# Patient Record
Sex: MALE | Race: WHITE | NOT HISPANIC OR LATINO | Employment: FULL TIME | URBAN - METROPOLITAN AREA
[De-identification: names, ages, dates, MRNs, and addresses within clinical notes are randomized per-mention and may not be internally consistent; named-entity substitution may affect disease eponyms.]

---

## 2023-10-16 PROBLEM — H93.11 TINNITUS OF RIGHT EAR: Status: ACTIVE | Noted: 2023-10-16

## 2023-10-16 PROBLEM — H81.09 MENIERE DISEASE: Status: ACTIVE | Noted: 2023-10-16

## 2023-10-16 PROBLEM — H90.3 ASYMMETRIC SNHL (SENSORINEURAL HEARING LOSS): Status: ACTIVE | Noted: 2023-10-16

## 2023-10-16 RX ORDER — DIAZEPAM 5 MG/1
5 TABLET ORAL
COMMUNITY
End: 2024-03-26 | Stop reason: ALTCHOICE

## 2023-10-18 ENCOUNTER — ANCILLARY PROCEDURE (OUTPATIENT)
Dept: RADIOLOGY | Facility: CLINIC | Age: 48
End: 2023-10-18
Payer: COMMERCIAL

## 2023-10-18 ENCOUNTER — OFFICE VISIT (OUTPATIENT)
Dept: ORTHOPEDIC SURGERY | Facility: CLINIC | Age: 48
End: 2023-10-18
Payer: COMMERCIAL

## 2023-10-18 DIAGNOSIS — M25.561 ACUTE PAIN OF RIGHT KNEE: Primary | ICD-10-CM

## 2023-10-18 DIAGNOSIS — M25.561 ACUTE PAIN OF RIGHT KNEE: ICD-10-CM

## 2023-10-18 PROCEDURE — 73564 X-RAY EXAM KNEE 4 OR MORE: CPT | Mod: RT

## 2023-10-18 PROCEDURE — 99203 OFFICE O/P NEW LOW 30 MIN: CPT | Performed by: ORTHOPAEDIC SURGERY

## 2023-10-18 PROCEDURE — 2500000004 HC RX 250 GENERAL PHARMACY W/ HCPCS (ALT 636 FOR OP/ED): Performed by: ORTHOPAEDIC SURGERY

## 2023-10-18 PROCEDURE — 73564 X-RAY EXAM KNEE 4 OR MORE: CPT | Mod: RIGHT SIDE | Performed by: ORTHOPAEDIC SURGERY

## 2023-10-18 PROCEDURE — 99213 OFFICE O/P EST LOW 20 MIN: CPT | Mod: 25 | Performed by: ORTHOPAEDIC SURGERY

## 2023-10-18 PROCEDURE — 2500000005 HC RX 250 GENERAL PHARMACY W/O HCPCS: Performed by: ORTHOPAEDIC SURGERY

## 2023-10-18 PROCEDURE — 20610 DRAIN/INJ JOINT/BURSA W/O US: CPT | Mod: RT | Performed by: ORTHOPAEDIC SURGERY

## 2023-10-18 RX ORDER — TRIAMCINOLONE ACETONIDE 55 UG/1
2 SPRAY, METERED NASAL DAILY
COMMUNITY
Start: 2023-08-01

## 2023-10-18 RX ORDER — CETIRIZINE HYDROCHLORIDE 10 MG/1
10 TABLET ORAL
COMMUNITY

## 2023-10-18 RX ORDER — MELOXICAM 15 MG/1
15 TABLET ORAL
COMMUNITY
Start: 2023-10-03 | End: 2024-02-14 | Stop reason: SDUPTHER

## 2023-10-18 RX ORDER — LIDOCAINE HYDROCHLORIDE 10 MG/ML
5 INJECTION INFILTRATION; PERINEURAL
Status: COMPLETED | OUTPATIENT
Start: 2023-10-18 | End: 2023-10-18

## 2023-10-18 RX ORDER — BETAMETHASONE SODIUM PHOSPHATE AND BETAMETHASONE ACETATE 3; 3 MG/ML; MG/ML
12 INJECTION, SUSPENSION INTRA-ARTICULAR; INTRALESIONAL; INTRAMUSCULAR; SOFT TISSUE
Status: COMPLETED | OUTPATIENT
Start: 2023-10-18 | End: 2023-10-18

## 2023-10-18 RX ADMIN — BETAMETHASONE ACETATE AND BETAMETHASONE SODIUM PHOSPHATE 12 MG: 3; 3 INJECTION, SUSPENSION INTRA-ARTICULAR; INTRALESIONAL; INTRAMUSCULAR; SOFT TISSUE at 10:11

## 2023-10-18 RX ADMIN — LIDOCAINE HYDROCHLORIDE 5 ML: 10 INJECTION, SOLUTION INFILTRATION; PERINEURAL at 10:11

## 2023-10-18 NOTE — PROGRESS NOTES
History: Jacoby is here for his right knee.  He has had a history of increasing right knee pain over the last few years.  He denies any trauma.  He has pain mainly on the inside the knee.  He has been taking some anti-inflammatory medicine which does help.    Past medical history: Multiple  Medications: Multiple  Allergies: No known drug allergies    Please refer to the intake H&P regarding the patient's review of systems, family history and social history as was done today    HEENT: Normal  Lungs: Clear to auscultation  Heart: RRR  Abdomen: Soft, nontender  Skin: clear  Extremity: He has tenderness more medially.  1+ crepitus with range of motion.  Negative Lachman and posterior drawer.  Mild laxity with varus stress.  Positive posterior Lachman no numbness.    Contralateral exam is normal for strength, motion, stability and neurovascular assessment.    Radiographs: X-rays show mild to moderate degenerative change with some medial joint space loss.  There is some spurring around the patella.    Assessment: Moderate right knee DJD    Plan: We discussed several options and he would like to try to get off the medicine although it does help with the knee pain.  He would like to try an injection today.  He can wean off the anti-inflammatory medicines if doing better.  We discussed multiple other options for his knee arthritis as well as further imaging down the road if he has more mechanical symptoms.  All questions were answered today with the patient.    L Inj/Asp: R knee on 10/18/2023 10:11 AM  Indications: pain  Details: 22 G needle, lateral approach  Medications: 12 mg betamethasone acet,sod phos 6 mg/mL; 5 mL lidocaine 10 mg/mL (1 %)  Outcome: tolerated well, no immediate complications  Procedure, treatment alternatives, risks and benefits explained, specific risks discussed. Consent was given by the patient. Immediately prior to procedure a time out was called to verify the correct patient, procedure,  equipment, support staff and site/side marked as required. Patient was prepped and draped in the usual sterile fashion.          This note was generated with voice recognition software and may contain grammatical errors.

## 2023-11-22 ENCOUNTER — OFFICE VISIT (OUTPATIENT)
Dept: ORTHOPEDIC SURGERY | Facility: CLINIC | Age: 48
End: 2023-11-22
Payer: COMMERCIAL

## 2023-11-22 DIAGNOSIS — M17.11 PRIMARY OSTEOARTHRITIS OF RIGHT KNEE: Primary | ICD-10-CM

## 2023-11-22 PROCEDURE — 99213 OFFICE O/P EST LOW 20 MIN: CPT | Performed by: ORTHOPAEDIC SURGERY

## 2023-11-22 NOTE — PROGRESS NOTES
History: Jacoby is here for his right knee.  We did a cortisone shot which helped for about 2 weeks.  It is now worn off.  He is no longer taking his meloxicam either.  Here today for recheck.    Past medical history: Multiple  Medications: Multiple  Allergies: No known drug allergies    Please refer to the intake H&P regarding the patient's review of systems, family history and social history as was done today    HEENT: Normal  Lungs: Clear to auscultation  Heart: RRR  Abdomen: Soft, nontender  Skin: clear  Extremity: He has tenderness again medially.  1+ crepitus with range of motion.  Negative Lachman and posterior drawer.  Positive medial Keena's maneuver.  No numbness today.  Motion is full with minimal swelling.  Contralateral exam is normal for strength, motion, stability and neurovascular assessment.    Radiographs: Prior x-rays showed moderate degenerative change with some medial joint space narrowing.    Assessment: Moderate right knee DJD with possible internal derangement.    Plan: At this point he is still having persistent pain and trouble doing activities.  I would recommend an MRI to rule out any other internal derangement.  He understands he has some arthritic change that we can certainly assess for other abnormalities.  He denies need for any restrictions and does not wish to continue taking anti-inflammatories as it was not helping.  Will see him back to go the results.  All questions were answered today with the patient.    This note was generated with voice recognition software and may contain grammatical errors.

## 2023-12-15 ENCOUNTER — APPOINTMENT (OUTPATIENT)
Dept: RADIOLOGY | Facility: CLINIC | Age: 48
End: 2023-12-15
Payer: COMMERCIAL

## 2023-12-15 ENCOUNTER — OFFICE VISIT (OUTPATIENT)
Dept: OTOLARYNGOLOGY | Facility: CLINIC | Age: 48
End: 2023-12-15
Payer: COMMERCIAL

## 2023-12-15 ENCOUNTER — CLINICAL SUPPORT (OUTPATIENT)
Dept: AUDIOLOGY | Facility: CLINIC | Age: 48
End: 2023-12-15
Payer: COMMERCIAL

## 2023-12-15 ENCOUNTER — ANCILLARY PROCEDURE (OUTPATIENT)
Dept: RADIOLOGY | Facility: CLINIC | Age: 48
End: 2023-12-15
Payer: COMMERCIAL

## 2023-12-15 DIAGNOSIS — H81.01 MENIERE'S DISEASE OF RIGHT EAR: Primary | ICD-10-CM

## 2023-12-15 DIAGNOSIS — H81.01 MENIERE'S DISEASE OF RIGHT EAR: ICD-10-CM

## 2023-12-15 DIAGNOSIS — H90.41 SENSORINEURAL HEARING LOSS (SNHL) OF RIGHT EAR WITH UNRESTRICTED HEARING OF LEFT EAR: Primary | ICD-10-CM

## 2023-12-15 DIAGNOSIS — M17.11 PRIMARY OSTEOARTHRITIS OF RIGHT KNEE: ICD-10-CM

## 2023-12-15 PROCEDURE — 92567 TYMPANOMETRY: CPT | Performed by: AUDIOLOGIST

## 2023-12-15 PROCEDURE — 73721 MRI JNT OF LWR EXTRE W/O DYE: CPT | Mod: RIGHT SIDE | Performed by: RADIOLOGY

## 2023-12-15 PROCEDURE — 73721 MRI JNT OF LWR EXTRE W/O DYE: CPT | Mod: RT

## 2023-12-15 PROCEDURE — 92557 COMPREHENSIVE HEARING TEST: CPT | Performed by: AUDIOLOGIST

## 2023-12-15 PROCEDURE — 99213 OFFICE O/P EST LOW 20 MIN: CPT | Performed by: OTOLARYNGOLOGY

## 2023-12-15 NOTE — PROGRESS NOTES
Camron Junior is a 48 y.o. year old male patient with 6 MONTH FU ON MENIERE'S / HEARING       The patient presents to the office today for follow-up on ears and hearing.  The patient has known history of right sensorineural loss.  The patient has history of Ménière's disease.  Overall the patient reports that he has had no significant episodes since his last visit in the spring.  Does have some intermittent tinnitus in the right ear.  All other ENT issues are negative.        Physical Exam:   General appearance: No acute distress. Normal facies. Symmetric facial movement. No gross lesions of the face are noted.  The external ear structures appear normal. The ear canals patent and the tympanic membranes are intact without evidence of air-fluid levels, retraction, or congenital defects.  Anterior rhinoscopy notes essentially a midline nasal septum. Examination is noted for normal healthy mucosal membranes without any evidence of lesions, polyps, or exudate. The tongue is normally mobile. There are no lesions on the gingiva, buccal, or oral mucosa. There are no oral cavity masses.  The neck is negative for mass lymphadenopathy. The trachea and parotid are clear. The thyroid bed is grossly unremarkable. The salivary gland structures are grossly unremarkable.    Audiogram: Audiogram with essentially normal hearing left with mild sensorineural hearing loss right    Assessment/Plan   1.  Ménière's disease  Patient seen in the office today for follow-up on ears with history of Ménière's disease right.  Hearing is stable when compared to spring.  At this time I recommend observation and follow-up in 1 year.    All questions were answered in this regard accordingly.

## 2023-12-15 NOTE — PROGRESS NOTES
Mr. Junior, age 48, returned today for a repeat hearing evaluation during his ENT follow up with Dr. Dwyer.  He has a history of Meniere's in his right ear but stated he has not had any episodes or flare ups since his last office visit in the spring.  He does admit to intermittent tinnitus in his right ear but feels his right hearing has at least slightly improved.    Results:  Otoscopy revealed clear ear canals and tympanic membranes were visualized bilaterally.  Tympanometry revealed normal, Type A tympanograms, indicating normal ear canal volume, peak pressure and compliance bilaterally.  Audiometric thresholds revealed normal hearing sensitivity in his left ear and a mild sensorineural hearing loss in his right ear.  Word recognition scores were excellent bilaterally.  Hearing levels have improved overall as compared to his last evaluation April 2023.    Recommendations:  Follow-up with PCP, Cathy King CNP, as medically directed.  Follow-up with ENT, Dr. Dwyer, as medically directed.  Retest hearing annually to monitor or should concerns for a change in hearing arise.

## 2023-12-27 ENCOUNTER — OFFICE VISIT (OUTPATIENT)
Dept: ORTHOPEDIC SURGERY | Facility: CLINIC | Age: 48
End: 2023-12-27
Payer: COMMERCIAL

## 2023-12-27 DIAGNOSIS — M23.203 OLD COMPLEX TEAR OF MEDIAL MENISCUS OF RIGHT KNEE: ICD-10-CM

## 2023-12-27 DIAGNOSIS — M17.11 PRIMARY OSTEOARTHRITIS OF RIGHT KNEE: Primary | ICD-10-CM

## 2023-12-27 PROCEDURE — 99213 OFFICE O/P EST LOW 20 MIN: CPT | Performed by: ORTHOPAEDIC SURGERY

## 2023-12-27 NOTE — PROGRESS NOTES
History: Camron is here for his right knee.  He is still having persistent pain on the inside of the knee.  He did obtain his MRI.  He is not taking any medicine and since he has been off work for the last week he states his knee is feeling better.    Past medical history: Multiple  Medications: Multiple  Allergies: No known drug allergies    Please refer to the intake H&P regarding the patient's review of systems, family history and social history as was done today    HEENT: Normal  Lungs: Clear to auscultation  Heart: RRR  Abdomen: Soft, nontender  Skin: clear  Extremity: He again has tenderness over the medial joint.  1+ crepitus with range of motion.  Negative Lachman and posterior drawer.  Mildly positive medial Keena's maneuver.  No numbness or tingling.  Contralateral exam is normal for strength, motion, stability and neurovascular assessment.    Radiographs: MRI was reviewed showing a complex tear of the posterior horn of the medial meniscus.  Also questionable lateral meniscal tear.  There is some moderate developing arthritic change medially and laterally.    Assessment: Moderate right knee DJD with medial and lateral meniscal tears    Plan: Had a long discussion regarding treatment options.  We discussed the potential for meniscectomy as well as for advancement of his arthritis.  Given his improvement recently he would like to hold off on any surgery.  He is going to try to get back into some low impact type exercises as these did not bother him before.  He can use medicine or consider a gel injection as well.  If he would like to pursue diagnostic arthroscopy he can certainly call to set this up.  Will see him back over the next 6 weeks if needed.  All questions were answered today with the patient.    This note was generated with voice recognition software and may contain grammatical errors.

## 2024-02-14 DIAGNOSIS — M17.11 PRIMARY OSTEOARTHRITIS OF RIGHT KNEE: ICD-10-CM

## 2024-02-14 RX ORDER — MELOXICAM 15 MG/1
15 TABLET ORAL
Qty: 30 TABLET | Refills: 2 | Status: SHIPPED | OUTPATIENT
Start: 2024-02-14 | End: 2024-05-14

## 2024-03-05 NOTE — H&P (VIEW-ONLY)
Subjective  HPI Patient here for preoperative clearance.  He is scheduled for right knee arthroscopy on 3/28/24 with Dr. Tadeo José. He is not yet scheduled for PAT with the hospital.  He has never had anesthesia in the past but denies issues with family members in the past.  He is active, going to the  several nights a week doing both cardio and weights.  Can ride stationary bike for 25+ minutes without symptoms.  No chest pain or shortness of breath.  Currently feeling well. No constitutional symptoms.      STOP BANG Questionnaire     1. Snoring   Do you snore loudly (louder than talking or loud enough to be heard   through closed doors)?   YES  2. Tired   Do you often feel tired, fatigued, or sleepy during daytime?   NO  3. Observed   Has anyone observed you stop breathing during your sleep?   NO  4. Blood Pressure   Do you have or are you being treated for high blood pressure?   NO  5. BMI   BMI more than 35 kg/m2?   NO  6. Age   Age over 50 yr old?   NO  7. Neck circumference   Neck circumference greater than 40 cm?   YES  8. Gender   Gender male?   YES  * Neck circumference is measured by staff     High risk of LAURITA: answering yes to three or more items   Low risk of LAURITA: answering yes to less than three items      Review of Systems   All other systems reviewed and are negative.     Past Medical History:   PAST MEDICAL HISTORY   Diagnosis Date   • Abnormal LFTs    • Allergic rhinitis     seasonal allergies   • Hyperlipidemia    • Meniere's syndrome 2023         Past Surgical History:   PAST SURGICAL HISTORY   Procedure Laterality Date   • NONE       Family History:   FAMILY HISTORY    Problem Relation Age of Onset   • Cancer Paternal Grandmother    • Heart Paternal Grandmother    • Cancer Maternal Grandfather    • Heart Father    • Diabetes Paternal Aunt    • Cancer Mother         Ovarian     Social History:   Social History     Tobacco Use   • Smoking status: Never   • Smokeless tobacco: Never  "  Vaping Use   • Vaping Use: Never used   Substance Use Topics   • Alcohol use: Yes     Comment: seldom   • Drug use: Never     Current Medications: triamcinolone acetonide (NASACORT AQ) 55 mcg nasal inhaler, Use 2 Sprays in the nose as needed., Disp: , Rfl:   cetirizine (ZYRTEC) 10 mg tablet, Take 10 mg by mouth once daily., Disp: , Rfl:   Omega-3 Fatty Acids 300 mg cap, Take  by mouth., Disp: , Rfl:   MULTI-VITAMIN ORAL, Take  by mouth., Disp: , Rfl:   meloxicam (MOBIC) 15 mg tablet, Take 1 tablet by mouth once daily., Disp: 30 tablet, Rfl: 1  [DISCONTINUED] methylPREDNISolone (MEDROL, CAMERON,) 4 mg Dose-Pack, As instructed per package, Disp: 21 tablet, Rfl: 0  [DISCONTINUED] meloxicam (MOBIC) 15 mg tablet, Take 1 tablet by mouth once daily., Disp: 30 tablet, Rfl: 1    No facility-administered encounter medications on file as of 3/5/2024.      Allergies:   ALLERGIES   Allergen Reactions   • Seasonal Allergies   Unknown       Vitals: /78  Pulse 66  Temp 98.3  Ht 6' 1\" (1.85m)  Wt 264 lb 12.4 oz (120.1kg)  SpO2 95%  BMI 34.94 kg/(m^2).            Objective  Physical Exam  HENT:      Head: Normocephalic and atraumatic.      Right Ear: Tympanic membrane normal.      Left Ear: Tympanic membrane normal.   Eyes:      Conjunctiva/sclera: Conjunctivae normal.   Cardiovascular:      Rate and Rhythm: Normal rate and regular rhythm.      Heart sounds: Normal heart sounds.   Pulmonary:      Effort: Pulmonary effort is normal.      Breath sounds: Normal breath sounds.   Abdominal:      Palpations: Abdomen is soft.   Musculoskeletal:      Cervical back: Normal range of motion.   Skin:     General: Skin is warm and dry.   Neurological:      Mental Status: He is alert and oriented to person, place, and time.      Gait: Gait is intact.   Psychiatric:         Mood and Affect: Mood and affect normal.         Cognition and Memory: Memory normal.         Judgment: Judgment normal.        ASSESSMENT/PLAN:    1. Preoperative " clearance - ICD9: V72.84, ICD10: Z01.818 (primary diagnosis)  Patient at acceptable risk for planned procedure.  Aware he will need to stop NSAIDS at least 7 days prior to procedure.  At risk for LAURITA given STOPBANG results.     2. Primary osteoarthritis of right knee - ICD9: 715.16, ICD10: M17.11  Agree with planned procedure.     Cathy King, APRN.CNP

## 2024-03-06 PROBLEM — S83.241A OTHER TEAR OF MEDIAL MENISCUS, CURRENT INJURY, RIGHT KNEE, INITIAL ENCOUNTER: Status: ACTIVE | Noted: 2024-03-05

## 2024-03-06 PROBLEM — S83.281A OTHER TEAR OF LATERAL MENISCUS, CURRENT INJURY, RIGHT KNEE, INITIAL ENCOUNTER: Status: ACTIVE | Noted: 2024-03-05

## 2024-03-08 ENCOUNTER — HOSPITAL ENCOUNTER (OUTPATIENT)
Dept: CARDIOLOGY | Facility: HOSPITAL | Age: 49
Discharge: HOME | End: 2024-03-08
Payer: COMMERCIAL

## 2024-03-08 DIAGNOSIS — S83.281A OTHER TEAR OF LATERAL MENISCUS, CURRENT INJURY, RIGHT KNEE, INITIAL ENCOUNTER: ICD-10-CM

## 2024-03-08 DIAGNOSIS — S83.241A OTHER TEAR OF MEDIAL MENISCUS, CURRENT INJURY, RIGHT KNEE, INITIAL ENCOUNTER: ICD-10-CM

## 2024-03-08 LAB
ATRIAL RATE: 66 BPM
P AXIS: 33 DEGREES
P OFFSET: 191 MS
P ONSET: 141 MS
PR INTERVAL: 158 MS
Q ONSET: 220 MS
QRS COUNT: 10 BEATS
QRS DURATION: 92 MS
QT INTERVAL: 374 MS
QTC CALCULATION(BAZETT): 392 MS
QTC FREDERICIA: 386 MS
R AXIS: 10 DEGREES
T AXIS: 26 DEGREES
T OFFSET: 407 MS
VENTRICULAR RATE: 66 BPM

## 2024-03-08 PROCEDURE — 93010 ELECTROCARDIOGRAM REPORT: CPT | Performed by: INTERNAL MEDICINE

## 2024-03-08 PROCEDURE — 93005 ELECTROCARDIOGRAM TRACING: CPT

## 2024-03-18 NOTE — PROGRESS NOTES
Camron Junior is here today for a pre-op visit of his right knee.  He is scheduled for right knee partial medial and lateral meniscectomy    This is a pre-op visit to discuss the risks and benefits of surgery. The risks and benefits were fully explained to the patient. The patient wishes to proceed.     With any surgery, infection is a risk; usually 1-2%. It can become higher for individuals with diabetes, rheumatoid arthritis, previous surgery, individuals on oral steroids, or obese individuals. All of these issues were properly addressed and considered. Reassured all sterile techniques would be followed.  Severe infections may require removal of any prosthesis.    The patient will be given preop antibiotics. It was also explained to the patient that there will be some blood loss during the procedure but that blood transfusion is usually not necessary. It is also noted that with knee surgery a tourniquet is applied to lower the amount of blood loss during the case.    Pulmonary embolism and blood clots were also discussed with the patient and told that these can have fatal complications. It is explained to the patient that the use of ROBERT hose, foot pumps, incentive spirometers, quick mobility and the use of blood thinners/Aspirin for a period of 21-28 days is the standard preventative care.     It was explained that surgery is often used to decrease pain, provide stability, and improve strength but individuals will have varying results postoperatively. There can be variation in motion and a risk of stiffness. It is also stated that occasionally we will have to manipulate an extremity if pain and stiffness persist. We also discussed there are limitations with some motions after certain surgeries.     Fracture, though rare, may also occur intraoperatively. There is also the possibility of nerve or vascular injuries as well. There is potential for continued numbness or tingling. The benefits of  anesthesia were explained to the patient.     All of the patient's questions were answered.     Results/Imaging: Previous MRI showed a posterior horn medial meniscus tear and a questionable lateral meniscus tear.  There is moderate arthritic change both medially and laterally.    Assessment: Moderate right knee DJD with medial and lateral meniscal tears    Plan:   I have personally reviewed the OARRS report for this patient. This report is scanned into the electronic medical record. I have considered the risks of abuse, dependence, addiction, and diversion. The patient's current pain level is 5.  Post operative pain medication was sent to the patient's pharmacy.  The patient will not begin taking this until after surgery.     He understands the arthroscopy will not treat any of his underlying arthritic change.    He will follow up 1 week post op.    This note was generated with voice recognition software and may contain grammatical errors.

## 2024-03-18 NOTE — PROGRESS NOTES
"  Physical Therapy  Physical Therapy Evaluation    Patient Name: Camron Junior \"Paco\"  MRN: [id number]  Today's Date: 3/20/2024  Time Calculation  Start Time: 0900  Stop Time: 0939  Time Calculation (min): 39 min    Insurance:  Visit number: 1  Authorization info: no auth required  Insurance Type: [employer] [place] [employer] [place]    General:  Reason for visit: R knee pain  Referred by: Edgar José MD    Current Problem:  1. Right knee pain  Follow Up In Physical Therapy                 Medical History Form: Reviewed (scanned into chart)    Subjective:     HPI:  Pt reports to PT with c/o R knee pain. Reports he has noticed pain has progressively gotten worse starting this last fall (around 11/23). Noticed minor improvement with performance of lower body exercises, but would only last temporarily. Started seeing Dr. José in 11/23 and pursued conservative treatment route with injections to see if it was effective. Reported they effective for about a week, then no longer effective. Followed up with Dr. José in 01/24 and discussed going route of surgical intervention. Will have surgery 03/28/24 for R knee medial and lateral menisectomy. Denies any n/t.     Chief Complaint: Patient presents to clinic c/o R knee pain  Onset Date: 11/22/23  EMERSON: Insidious    Current Condition:   Same    Pertinent PMH includes:  PSHx:    Pain:     Location: R knee, medial joint line  Description: stabbing  Worst: 6/10  Best: 2/10  Current: 3/10  Aggravating Factors:  standing for too long, kneeling, squatting, getting off ground,   Relieving Factors:  off-weighting      Relevant Information (PMH & Previous Tests/Imaging):   X-rays 10/18/23: \"mild to moderate degenerative change with medial and patellofemoral narrowing. There is spurring as well. Alignment is neutral.  Fragmentation of the tibial tubercle from prior Osgood-Schlatter's  disease is also present.\"    MRI 12/15/23: \"1. Complex tearing of the medial meniscus.  2. Possible " "mild free margin radial blunting of the body of the  lateral meniscus.  3. Mild-to-moderate degenerative change of the knee with hyaline  articular cartilage fissuring/loss as above.  4. Findings which may represent sequelae of prior Osgood-Schlatter's  disease. There is mild deep infrapatellar bursitis.  5. Small volume knee joint effusion and small volume popliteal cyst.\"     Previous Interventions/Treatments: Injections    Prior Level of Function (PLOF)  Patient previously independent with all ADLs  Exercise/Physical Activity: going to the gym, weight lifting  Work/School: , 5th grade  Biggest limitation: standing long times  Chief complaint: pain      Patients Living Environment: Reviewed and no concern    Primary Language: English    Patient's Goal(s) for Therapy: Return ROM     Red Flags: Do you have any of the following? No  Fever/chills, unexplained weight changes, dizziness/fainting, unexplained change in bowel or bladder functions, unexplained malaise or muscle weakness, night pain/sweats, numbness or tingling    Objective:    Lx screen: unremarkable    Gait Assessment - no signs of antalgic gait    AROM  Right knee flexion: 125    Left knee flexion: 130  Right knee extension: 0   Left knee extension: 0     MMT  Right quadriceps: 4/5   Left quadriceps: 4/5  Right iliopsoas: 4-/5   Left iliopsoas: 4-/5  Right gluteus medius: 5/5     Left gluteus medius: 5/5  Right hamstrin/5  Left hamstrin/5    Palpation  TTP at R knee medial joint line; No TTP at lateral joint line, B hamstring tendons, patella/patellar tendon    Special Tests    Thessaly (+)  Keena R (-)  Lachman R/L (-)  Valgus R/L (-)  Varus  R/L (-)        Outcome Measures:  LEFS: 58/80      EDUCATION: Pt educated in HEP, PT POC, anatomy. pt demonstrates understanding of PT info, all questions answered.    HEP:  Single leg bridge 2x10  Staggered STS 2x10  Knee ext. Iso vs. Band 2x10 5 sec hold black tb  Band resisted " "side steps 2x10 blue tb      Goals: Set and discussed today  1.) Independent with HEP to expedite progress and promote goal achievement.  2.) Reduce worst reported pain to < 3/10 in order to meet MCID.   3.) Improve R knee  ROM to WFL post-op in order to allow patient to safely navigate home and community environments without compensatory strategies.   4.) Improve R knee and hip strength to >/= 4/5 in order to allow patient to return to PLOF  and return to performance of healthful and wellness activities without limitation/with decreased discomfort  5.) Report change in LEFS by greater than or equal to 9 points to meet the MCID (IE 58/80)        Plan of care was developed with input and agreement by the patient.    Treatment Performed:    Therapeutic Exercise:    10 min  Single leg bridge 1x10  Staggered STS 1x10  Knee ext. Iso vs. Band 1x10  Band resisted side steps 1x10        Assessment: 47 y/o M presents with c/o chronic R knee pain. Upon examination patient demonstrates pain, decreased R knee ROM, and decreased R LE strength limiting overall functional mobility including standing. Activity limitations and participations restrictions include decreased time in gym lifting weights. Pt presentation consistent with dx of R knee pain 2/2 R medial and lateral meniscus tears. Pt to benefit from outpatient PT to address deficits, maximize functional mobility and improve QOL.  The clinical presentation of this patient is stable and their history and examination findings are consistent with a low complexity evaluation with good rehab potential.           Plan:   Camron, \"Paco\" will be placed on hold for therapy until after completion of surgical procedure. Upon return to therapy, patient's status will be re-evaluated. POFU scheduled today. Discussed anticipated plan of scheduling patient 1-2x/wk for 8 weeks post-op with patient.       Marcin Alanis, PT  "

## 2024-03-20 ENCOUNTER — EVALUATION (OUTPATIENT)
Dept: PHYSICAL THERAPY | Facility: CLINIC | Age: 49
End: 2024-03-20
Payer: COMMERCIAL

## 2024-03-20 ENCOUNTER — OFFICE VISIT (OUTPATIENT)
Dept: ORTHOPEDIC SURGERY | Facility: CLINIC | Age: 49
End: 2024-03-20
Payer: COMMERCIAL

## 2024-03-20 DIAGNOSIS — G89.29 CHRONIC PAIN OF RIGHT KNEE: ICD-10-CM

## 2024-03-20 DIAGNOSIS — M23.203 OLD COMPLEX TEAR OF MEDIAL MENISCUS OF RIGHT KNEE: ICD-10-CM

## 2024-03-20 DIAGNOSIS — M17.11 PRIMARY OSTEOARTHRITIS OF RIGHT KNEE: Primary | ICD-10-CM

## 2024-03-20 DIAGNOSIS — M25.561 CHRONIC PAIN OF RIGHT KNEE: ICD-10-CM

## 2024-03-20 DIAGNOSIS — M25.561 RIGHT KNEE PAIN: Primary | ICD-10-CM

## 2024-03-20 PROCEDURE — 97161 PT EVAL LOW COMPLEX 20 MIN: CPT | Mod: GP

## 2024-03-20 PROCEDURE — 99024 POSTOP FOLLOW-UP VISIT: CPT | Performed by: PHYSICIAN ASSISTANT

## 2024-03-20 PROCEDURE — 97110 THERAPEUTIC EXERCISES: CPT | Mod: GP

## 2024-03-20 RX ORDER — HYDROCODONE BITARTRATE AND ACETAMINOPHEN 5; 325 MG/1; MG/1
1 TABLET ORAL EVERY 6 HOURS PRN
Qty: 12 TABLET | Refills: 0 | Status: SHIPPED | OUTPATIENT
Start: 2024-03-20 | End: 2024-03-23

## 2024-03-20 ASSESSMENT — PATIENT HEALTH QUESTIONNAIRE - PHQ9
1. LITTLE INTEREST OR PLEASURE IN DOING THINGS: NOT AT ALL
2. FEELING DOWN, DEPRESSED OR HOPELESS: NOT AT ALL
SUM OF ALL RESPONSES TO PHQ9 QUESTIONS 1 AND 2: 0

## 2024-03-28 ENCOUNTER — ANESTHESIA (OUTPATIENT)
Dept: OPERATING ROOM | Facility: HOSPITAL | Age: 49
End: 2024-03-28
Payer: COMMERCIAL

## 2024-03-28 ENCOUNTER — HOSPITAL ENCOUNTER (OUTPATIENT)
Facility: HOSPITAL | Age: 49
Setting detail: OUTPATIENT SURGERY
Discharge: HOME | End: 2024-03-28
Attending: ORTHOPAEDIC SURGERY | Admitting: ORTHOPAEDIC SURGERY
Payer: COMMERCIAL

## 2024-03-28 ENCOUNTER — ANESTHESIA EVENT (OUTPATIENT)
Dept: OPERATING ROOM | Facility: HOSPITAL | Age: 49
End: 2024-03-28
Payer: COMMERCIAL

## 2024-03-28 VITALS
HEART RATE: 62 BPM | HEIGHT: 73 IN | BODY MASS INDEX: 34.04 KG/M2 | OXYGEN SATURATION: 97 % | WEIGHT: 256.84 LBS | DIASTOLIC BLOOD PRESSURE: 84 MMHG | RESPIRATION RATE: 18 BRPM | SYSTOLIC BLOOD PRESSURE: 122 MMHG | TEMPERATURE: 96.8 F

## 2024-03-28 DIAGNOSIS — S83.241A OTHER TEAR OF MEDIAL MENISCUS, CURRENT INJURY, RIGHT KNEE, INITIAL ENCOUNTER: Primary | ICD-10-CM

## 2024-03-28 DIAGNOSIS — S83.281A OTHER TEAR OF LATERAL MENISCUS, CURRENT INJURY, RIGHT KNEE, INITIAL ENCOUNTER: ICD-10-CM

## 2024-03-28 PROCEDURE — 2500000004 HC RX 250 GENERAL PHARMACY W/ HCPCS (ALT 636 FOR OP/ED): Performed by: PHYSICIAN ASSISTANT

## 2024-03-28 PROCEDURE — 2500000004 HC RX 250 GENERAL PHARMACY W/ HCPCS (ALT 636 FOR OP/ED): Performed by: STUDENT IN AN ORGANIZED HEALTH CARE EDUCATION/TRAINING PROGRAM

## 2024-03-28 PROCEDURE — 7100000010 HC PHASE TWO TIME - EACH INCREMENTAL 1 MINUTE: Performed by: ORTHOPAEDIC SURGERY

## 2024-03-28 PROCEDURE — 3600000009 HC OR TIME - EACH INCREMENTAL 1 MINUTE - PROCEDURE LEVEL FOUR: Performed by: ORTHOPAEDIC SURGERY

## 2024-03-28 PROCEDURE — 3700000001 HC GENERAL ANESTHESIA TIME - INITIAL BASE CHARGE: Performed by: ORTHOPAEDIC SURGERY

## 2024-03-28 PROCEDURE — A4217 STERILE WATER/SALINE, 500 ML: HCPCS | Performed by: ORTHOPAEDIC SURGERY

## 2024-03-28 PROCEDURE — 3700000002 HC GENERAL ANESTHESIA TIME - EACH INCREMENTAL 1 MINUTE: Performed by: ORTHOPAEDIC SURGERY

## 2024-03-28 PROCEDURE — 2720000007 HC OR 272 NO HCPCS: Performed by: ORTHOPAEDIC SURGERY

## 2024-03-28 PROCEDURE — 7100000001 HC RECOVERY ROOM TIME - INITIAL BASE CHARGE: Performed by: ORTHOPAEDIC SURGERY

## 2024-03-28 PROCEDURE — 7100000009 HC PHASE TWO TIME - INITIAL BASE CHARGE: Performed by: ORTHOPAEDIC SURGERY

## 2024-03-28 PROCEDURE — 2500000005 HC RX 250 GENERAL PHARMACY W/O HCPCS: Performed by: ORTHOPAEDIC SURGERY

## 2024-03-28 PROCEDURE — 2500000004 HC RX 250 GENERAL PHARMACY W/ HCPCS (ALT 636 FOR OP/ED): Performed by: ORTHOPAEDIC SURGERY

## 2024-03-28 PROCEDURE — 7100000002 HC RECOVERY ROOM TIME - EACH INCREMENTAL 1 MINUTE: Performed by: ORTHOPAEDIC SURGERY

## 2024-03-28 PROCEDURE — 3600000004 HC OR TIME - INITIAL BASE CHARGE - PROCEDURE LEVEL FOUR: Performed by: ORTHOPAEDIC SURGERY

## 2024-03-28 PROCEDURE — 2500000005 HC RX 250 GENERAL PHARMACY W/O HCPCS: Performed by: STUDENT IN AN ORGANIZED HEALTH CARE EDUCATION/TRAINING PROGRAM

## 2024-03-28 PROCEDURE — 29880 ARTHRS KNE SRG MNISECTMY M&L: CPT | Performed by: ORTHOPAEDIC SURGERY

## 2024-03-28 RX ORDER — LIDOCAINE HYDROCHLORIDE 20 MG/ML
INJECTION, SOLUTION INFILTRATION; PERINEURAL AS NEEDED
Status: DISCONTINUED | OUTPATIENT
Start: 2024-03-28 | End: 2024-03-28

## 2024-03-28 RX ORDER — LIDOCAINE HYDROCHLORIDE 10 MG/ML
INJECTION INFILTRATION; PERINEURAL AS NEEDED
Status: DISCONTINUED | OUTPATIENT
Start: 2024-03-28 | End: 2024-03-28 | Stop reason: HOSPADM

## 2024-03-28 RX ORDER — DROPERIDOL 2.5 MG/ML
0.62 INJECTION, SOLUTION INTRAMUSCULAR; INTRAVENOUS ONCE AS NEEDED
Status: DISCONTINUED | OUTPATIENT
Start: 2024-03-28 | End: 2024-03-28 | Stop reason: HOSPADM

## 2024-03-28 RX ORDER — SODIUM CHLORIDE, SODIUM LACTATE, POTASSIUM CHLORIDE, CALCIUM CHLORIDE 600; 310; 30; 20 MG/100ML; MG/100ML; MG/100ML; MG/100ML
100 INJECTION, SOLUTION INTRAVENOUS CONTINUOUS
Status: DISCONTINUED | OUTPATIENT
Start: 2024-03-28 | End: 2024-03-28 | Stop reason: HOSPADM

## 2024-03-28 RX ORDER — FENTANYL CITRATE 50 UG/ML
50 INJECTION, SOLUTION INTRAMUSCULAR; INTRAVENOUS EVERY 5 MIN PRN
Status: DISCONTINUED | OUTPATIENT
Start: 2024-03-28 | End: 2024-03-28 | Stop reason: HOSPADM

## 2024-03-28 RX ORDER — KETOROLAC TROMETHAMINE 30 MG/ML
INJECTION, SOLUTION INTRAMUSCULAR; INTRAVENOUS AS NEEDED
Status: DISCONTINUED | OUTPATIENT
Start: 2024-03-28 | End: 2024-03-28

## 2024-03-28 RX ORDER — MIDAZOLAM HYDROCHLORIDE 1 MG/ML
INJECTION, SOLUTION INTRAMUSCULAR; INTRAVENOUS AS NEEDED
Status: DISCONTINUED | OUTPATIENT
Start: 2024-03-28 | End: 2024-03-28

## 2024-03-28 RX ORDER — CEFAZOLIN SODIUM 2 G/100ML
2 INJECTION, SOLUTION INTRAVENOUS ONCE
Status: COMPLETED | OUTPATIENT
Start: 2024-03-28 | End: 2024-03-28

## 2024-03-28 RX ORDER — SODIUM CHLORIDE 0.9 G/100ML
IRRIGANT IRRIGATION AS NEEDED
Status: DISCONTINUED | OUTPATIENT
Start: 2024-03-28 | End: 2024-03-28 | Stop reason: HOSPADM

## 2024-03-28 RX ORDER — LABETALOL HYDROCHLORIDE 5 MG/ML
5 INJECTION, SOLUTION INTRAVENOUS ONCE AS NEEDED
Status: DISCONTINUED | OUTPATIENT
Start: 2024-03-28 | End: 2024-03-28 | Stop reason: HOSPADM

## 2024-03-28 RX ORDER — FENTANYL CITRATE 50 UG/ML
INJECTION, SOLUTION INTRAMUSCULAR; INTRAVENOUS AS NEEDED
Status: DISCONTINUED | OUTPATIENT
Start: 2024-03-28 | End: 2024-03-28

## 2024-03-28 RX ORDER — OXYCODONE HYDROCHLORIDE 5 MG/1
5 TABLET ORAL EVERY 4 HOURS PRN
Status: CANCELLED | OUTPATIENT
Start: 2024-03-28

## 2024-03-28 RX ORDER — SODIUM CHLORIDE, SODIUM LACTATE, POTASSIUM CHLORIDE, CALCIUM CHLORIDE 600; 310; 30; 20 MG/100ML; MG/100ML; MG/100ML; MG/100ML
100 INJECTION, SOLUTION INTRAVENOUS CONTINUOUS
Status: CANCELLED | OUTPATIENT
Start: 2024-03-28

## 2024-03-28 RX ORDER — DIPHENHYDRAMINE HYDROCHLORIDE 50 MG/ML
12.5 INJECTION INTRAMUSCULAR; INTRAVENOUS ONCE AS NEEDED
Status: DISCONTINUED | OUTPATIENT
Start: 2024-03-28 | End: 2024-03-28 | Stop reason: HOSPADM

## 2024-03-28 RX ORDER — MEPERIDINE HYDROCHLORIDE 25 MG/ML
12.5 INJECTION INTRAMUSCULAR; INTRAVENOUS; SUBCUTANEOUS EVERY 10 MIN PRN
Status: DISCONTINUED | OUTPATIENT
Start: 2024-03-28 | End: 2024-03-28 | Stop reason: HOSPADM

## 2024-03-28 RX ORDER — PROPOFOL 10 MG/ML
INJECTION, EMULSION INTRAVENOUS AS NEEDED
Status: DISCONTINUED | OUTPATIENT
Start: 2024-03-28 | End: 2024-03-28

## 2024-03-28 RX ORDER — ONDANSETRON HYDROCHLORIDE 2 MG/ML
INJECTION, SOLUTION INTRAVENOUS AS NEEDED
Status: DISCONTINUED | OUTPATIENT
Start: 2024-03-28 | End: 2024-03-28

## 2024-03-28 RX ORDER — LIDOCAINE HYDROCHLORIDE 10 MG/ML
0.1 INJECTION, SOLUTION EPIDURAL; INFILTRATION; INTRACAUDAL; PERINEURAL ONCE
Status: DISCONTINUED | OUTPATIENT
Start: 2024-03-28 | End: 2024-03-28 | Stop reason: HOSPADM

## 2024-03-28 RX ADMIN — FENTANYL CITRATE 50 MCG: 50 INJECTION, SOLUTION INTRAMUSCULAR; INTRAVENOUS at 08:09

## 2024-03-28 RX ADMIN — CEFAZOLIN SODIUM 2 G: 2 INJECTION, SOLUTION INTRAVENOUS at 08:02

## 2024-03-28 RX ADMIN — ONDANSETRON 4 MG: 2 INJECTION, SOLUTION INTRAMUSCULAR; INTRAVENOUS at 08:21

## 2024-03-28 RX ADMIN — PROPOFOL 300 MG: 10 INJECTION, EMULSION INTRAVENOUS at 07:56

## 2024-03-28 RX ADMIN — KETOROLAC TROMETHAMINE 30 MG: 30 INJECTION, SOLUTION INTRAMUSCULAR at 08:21

## 2024-03-28 RX ADMIN — LIDOCAINE HYDROCHLORIDE 100 MG: 20 INJECTION, SOLUTION INFILTRATION; PERINEURAL at 07:56

## 2024-03-28 RX ADMIN — PROPOFOL 100 MG: 10 INJECTION, EMULSION INTRAVENOUS at 08:09

## 2024-03-28 RX ADMIN — FENTANYL CITRATE 100 MCG: 50 INJECTION, SOLUTION INTRAMUSCULAR; INTRAVENOUS at 07:56

## 2024-03-28 RX ADMIN — MIDAZOLAM 2 MG: 1 INJECTION INTRAMUSCULAR; INTRAVENOUS at 07:56

## 2024-03-28 RX ADMIN — SODIUM CHLORIDE, POTASSIUM CHLORIDE, SODIUM LACTATE AND CALCIUM CHLORIDE 100 ML/HR: 600; 310; 30; 20 INJECTION, SOLUTION INTRAVENOUS at 06:52

## 2024-03-28 RX ADMIN — DEXAMETHASONE SODIUM PHOSPHATE 8 MG: 4 INJECTION, SOLUTION INTRAMUSCULAR; INTRAVENOUS at 08:04

## 2024-03-28 RX ADMIN — FENTANYL CITRATE 50 MCG: 50 INJECTION, SOLUTION INTRAMUSCULAR; INTRAVENOUS at 08:21

## 2024-03-28 ASSESSMENT — PAIN SCALES - GENERAL
PAINLEVEL_OUTOF10: 0 - NO PAIN
PAIN_LEVEL: 0
PAINLEVEL_OUTOF10: 0 - NO PAIN
PAINLEVEL_OUTOF10: 2
PAINLEVEL_OUTOF10: 0 - NO PAIN

## 2024-03-28 ASSESSMENT — PAIN - FUNCTIONAL ASSESSMENT
PAIN_FUNCTIONAL_ASSESSMENT: 0-10

## 2024-03-28 ASSESSMENT — COLUMBIA-SUICIDE SEVERITY RATING SCALE - C-SSRS
2. HAVE YOU ACTUALLY HAD ANY THOUGHTS OF KILLING YOURSELF?: NO
6. HAVE YOU EVER DONE ANYTHING, STARTED TO DO ANYTHING, OR PREPARED TO DO ANYTHING TO END YOUR LIFE?: NO
1. IN THE PAST MONTH, HAVE YOU WISHED YOU WERE DEAD OR WISHED YOU COULD GO TO SLEEP AND NOT WAKE UP?: NO

## 2024-03-28 ASSESSMENT — PAIN DESCRIPTION - DESCRIPTORS: DESCRIPTORS: DULL

## 2024-03-28 NOTE — ANESTHESIA PREPROCEDURE EVALUATION
Patient: Camron Junior    Procedure Information       Date/Time: 03/28/24 0800    Procedure: ARTHROSCOPY/MEDIAL AND LATERAL MENISCECTOMY (Right: Knee)    Location: [facility] / [facility]    Surgeons: Edgar José MD            Relevant Problems   Musculoskeletal   (+) Primary osteoarthritis of right knee      HEENT   (+) Asymmetric SNHL (sensorineural hearing loss)       Clinical information reviewed:   Tobacco  Allergies  Meds   Med Hx  Surg Hx   Fam Hx  Soc Hx        NPO Detail:  NPO/Void Status  Carbohydrate Drink Given Prior to Surgery? : N  Date of Last Liquid: 03/27/24  Time of Last Liquid: 2200  Date of Last Solid: 03/27/24  Time of Last Solid: 2100  Last Intake Type: Clear fluids  Time of Last Void: 0515         Physical Exam    Airway  Mallampati: II     Cardiovascular   Rhythm: regular  Rate: normal     Dental    Pulmonary - normal exam     Abdominal - normal exam             Anesthesia Plan    History of general anesthesia?: yes  History of complications of general anesthesia?: no    ASA 2     general     intravenous induction   Postoperative administration of opioids is intended.  Anesthetic plan and risks discussed with patient.

## 2024-03-28 NOTE — ANESTHESIA POSTPROCEDURE EVALUATION
Patient: Camron Junior    Procedure Summary       Date: 03/28/24 Room / Location: [facility] / [facility]    Anesthesia Start: 0752 Anesthesia Stop: 0839    Procedure: ARTHROSCOPY/MEDIAL AND LATERAL MENISCECTOMY (Right: Knee) Diagnosis:       Other tear of medial meniscus, current injury, right knee, initial encounter      Other tear of lateral meniscus, current injury, right knee, initial encounter      (Other tear of medial meniscus, current injury, right knee, initial encounter [S83.241A])      (Other tear of lateral meniscus, current injury, right knee, initial encounter [S83.281A])    Surgeons: Edgar José MD Responsible Provider: Reji Monterroso DO    Anesthesia Type: general ASA Status: 2            Anesthesia Type: general    Vitals Value Taken Time   /71 03/28/24 0836   Temp 36.3 03/28/24 0839   Pulse 68 03/28/24 0838   Resp 10 03/28/24 0839   SpO2 97 % 03/28/24 0838   Vitals shown include unvalidated device data.    Anesthesia Post Evaluation    Patient location during evaluation: bedside  Patient participation: complete - patient participated  Level of consciousness: awake and alert  Pain score: 0  Pain management: adequate  Airway patency: patent  Cardiovascular status: acceptable  Respiratory status: acceptable  Hydration status: acceptable  Postoperative Nausea and Vomiting: none        There were no known notable events for this encounter.

## 2024-03-28 NOTE — OP NOTE
ARTHROSCOPY/MEDIAL AND LATERAL MENISCECTOMY (R) Operative Note    Preop diagnosis: Right knee medial and lateral meniscal tears, DJD    Postop diagnosis: Same    Procedure:   Right knee arthroscopy with partial medial and lateral meniscectomies  Right knee arthroscopic chondroplasty of patellofemoral joint    Surgeon: Edgar José MD  Assistant: Kristy Donaldson PA-C      Findings: see procedure details    EBL: minimal    Procedure Details:   Indications: The patient was noted to have a medial and lateral meniscal tear.  The patient also had underlying arthritic change.  Having failed multiple conservative measures they elected to proceed with diagnostic arthroscopy and meniscectomy.  The risks and benefits of surgical intervention were noted with the patient and family.  These include infection, stiffness, nerve damage, continued pain as well as need for surgical operations.  Specifically the failure of arthroscopy to treat any of the underlying arthritic condition was noted preoperatively.  Informed consent was obtained prior to arrival in the operating.    Procedure: Upon arrival the patient was verified.  The patient was transported from a stretcher to the operative table placed in supine position.  An LMA was administered by the anesthesia staff.  Intravenous antibiotic were given prior to the start of the case.  No tourniquet was used during the procedure.  The right leg was prepped and draped in usual sterile fashion.  A standard inferior lateral arthroscopy portal was established and the arthroscope inserted into the suprasellar space.  The patella had some grade 2 change noted throughout the apex of the patella.  There is a small area of grade 2 and 3 change in the trochlea.  The medial femoral condyle had mild grade 2 change throughout.  There is grade 2 change in the medial tibial plateau.  There is a macerated tear of the posterior horn of the medial meniscus.  Under direct visualization inferior  medial portal was established.  The medial meniscal tear was dissected back to stable tissue using an up-biting forceps and shaver.  The majority of the posterior horn attachment was intact.  Approximately 30% of the medial meniscus was removed.  A gentle chondroplasty was performed medially.  The ACL and PCL were intact.  The posterior compartment was normal.  Lateral femoral condyle had some grade 1 change centrally.  There was some grade 1 and a smaller grade 1 change in the lateral tibial plateau.  There was a wall tear of the central and posterior portion of the lateral meniscus. This was again debrided using up-biting forceps and shaver.  Approximately 10% of the lateral meniscus was removed with the anterior and posterior horn attachments remaining intact.  Attention was directed back to the patellofemoral joint where chondroplasty was performed again removing all loose fragments.  The knee was copiously irrigated through the shaver and suctioned dry.  Portal sites were closed with 3-0 nylon suture.  All sponge and needle counts are correct prior to closure.  A sterile dressing was applied.  She was awoken from the anesthetic and taken to the recovery room in stable condition.    Kristy Donaldson PA-C was present throughout the entire case. Given the nature of the disease process and the procedure, a skilled surgical first assistant was necessary during the case. The assistant was necessary to hold retractors and to manipulate the extremity during the procedure. A certified scrub tech was at the back table managing the instruments and supplies for the surgical case.    Complications:  None; patient tolerated the procedure well.    Disposition: PACU - hemodynamically stable.  Condition: stable         Edgar José  Phone Number: [phone]

## 2024-03-28 NOTE — ANESTHESIA PROCEDURE NOTES
Airway  Date/Time: 3/28/2024 7:58 AM  Urgency: elective    Airway not difficult    Staffing  Performed: attending   Authorized by: Reji Monterroso DO    Performed by: Reji Monterroso DO  Patient location during procedure: OR    Indications and Patient Condition  Indications for airway management: anesthesia  Spontaneous Ventilation: absent  Sedation level: deep  Preoxygenated: yes  Mask difficulty assessment: 0 - not attempted    Final Airway Details  Final airway type: supraglottic airway      Successful airway: classic  Size 5     Number of attempts at approach: 1

## 2024-03-28 NOTE — DISCHARGE INSTRUCTIONS
Medication given may have significant effects after discharge. Therefore on the day of surgery:  1) you must be accompanied by a responsible adult upon discharge and for 24 hours after surgery.  Do not drive a motor vehicle, operate machinery, power tools or appliance, drink alcoholic beverages, or make critical decisions for 24 hours  2) Be aware of dizziness, which may cause a fall. Change positions slowly.  3) Eating: you may resume your regular diet but it is better to increase intake slowly with mild foods and working up to your regular diet. No greasy, fried or spicy foods today.  4) Nausea/Vomiting: Nausea and vomiting may occur as you become more active or begin to increase food intake. If this should happen, decrease activity and return to liquids. If the problem persists, call your surgeon  5) Pain: Your surgeon may have given you a prescription for pain medication. Take pain medication with food as prescribed. Pain medication may cause constipation, so drink plenty of fluids. If your pain medication does not provide adequate relief, call your surgeon  6) Urinating: Notify your surgeon if you have not urinated within 12 hours after discharge  7) Ice: Apply ice to operative site for 20 min 5-6 times a day or use Polar care as instructed  8) Dressing:   []   Remove dressing in 24hr    [x]  Remove dressing in 48hr   []  Leave open to air after initial dressing is taken off and incision is dry  Do not remove the steri-strips. (no bath/ hot tubs/ pools)   []  Leave dressing in place. Keep dressing/ incision clean and dry    9) Activity    Shoulder/ elbow/Hand   []  Elevate extremity    []  Sling   [] at all times (except for exercises and showering)  [] as needed only for comfort   [] Begin daily motion exercises out of sling as instructed   []  Bend and flex fingers/wrist/elbow frequently   [] other   Knee/ Ankle/ Foot   [] elevate extremity   [x] crutches        [] non-weight bearing to operative extremity   [] partial weight bearing  [x] Full weight bearing as tolerated   []  Use brace whenever walking   [] other      10) Begin physical therapy if advised by you physician:   [] before returning to see you doctor   [x] not until you follow up with your doctor    11) call your doctor at [phone] for an appointment (or follow up as scheduled)    Contact [facility] office if  Increased redness, swelling, drainage of any kind, and/or pain to surgery site.  As well as new onset fevers and or chills.  These could signify an infection.  Calf or thigh tenderness to touch as well as increased swelling or redness.  This could signify a clot formation.  Numbness or tingling to an area around the incision site or below the incision site (toes). Or if the operative extremity becomes cold, blue.  Any rash appears, increased  or new onset nausea/vomiting occur.  This may indicate a reaction to a medication.  Temp is 38.5 C (101F)  12) If you have any concerns or questions, please call [facility] surgeon on call. The 24- hour phone is [phone]  13) If you are unable to contact your surgeon, in an emergency situation, go to the nearest hospital      General Anesthesia Discharge Instructions    About this topic  You may need general anesthesia if you need to be asleep during a procedure. Your doctor will use drugs to block the signals that go from your nerves to your brain. Doctors give general anesthesia during a surgery or procedure to:  Allow you to sleep  Help your body be still  Relax your muscles  Help you to relax and be pain free  Keep you from remembering the surgery  Let the doctor manage your airway, breathing, and blood flow  The doctor or nurse anesthetist gives general anesthesia by a shot into your vein. Sometimes, you may breathe in a gas through a mask placed over your face.  What care is needed at home?  Ask your doctor what you need to do when you go home. Make sure you ask questions if  you do not understand what the doctor says.  Your doctor may give you drugs to prevent or treat an upset stomach from the anesthetic. Take them as ordered.  If your throat is sore, suck on ice chips or popsicles to ease throat pain.  Put 2 to 3 pillows under your head and back when you lie down to help you breathe easier.  For the first 24 to 48 hours:  Do not operate heavy or dangerous machinery.  Do not make major decisions or sign important papers. You may not be able to think clearly.  Avoid beer, wine, or mixed drinks.  You are at a higher risk of falling for at least 24 hours after general anesthesia.  Take extra care when you get up.  Do not change positions quickly.  Do not rush when you need to go to the bathroom or to answer the phone.  Ask for help if you feel unsteady when you try to walk.  Wear shoes with non-slip soles and low heels.  What follow-up care is needed?  Your doctor may ask you to come back to the office to check on your progress. Be sure to keep these visits.  If you have stitches that do not dissolve or staples, you will need to have them removed. Your doctor will want to do this in 1 to 2 weeks. If the doctor used skin glue, the glue will fall off on its own.  What drugs may be needed?  The doctor may order drugs to:  Help with pain  Treat an upset stomach or throwing up  Will physical activity be limited?  You will not be allowed to drive right away after the procedure. Ask a family member or a friend to drive you home.  Avoid trying to get out of bed without help until you are sure of your balance.  You may have to limit your activity. Talk to your doctor about if you need to limit how much you lift or limit exercise after your procedure.  What changes to diet are needed?  Start with a light diet when you are fully awake. This includes things that are easy to swallow like soups, pudding, jello, toast, and eggs. Slowly progress to your normal diet.  What problems could happen?  Low  blood pressure  Breathing problems  Upset stomach or throwing up  Dizziness  Blood clots  Infection  When do I need to call the doctor?  Trouble breathing  Upset stomach or throwing up more than 3 times in the next 2 days  Dizziness  Teach Back: Helping You Understand  The Teach Back Method helps you understand the information we are giving you. After you talk with the staff, tell them in your own words what you learned. This helps to make sure the staff has described each thing clearly. It also helps to explain things that may have been confusing. Before going home, make sure you can do these:  I can tell you about my procedure.  I can tell you if I need to follow up with my doctor.  I can tell you what is good for me to eat and drink the next day.  I can tell you what I would do if I have trouble breathing, an upset stomach, or dizziness.  Where can I learn more?  [employer]  https://www.nigms.nih.gov/education/pages/factsheet_Anesthesia.aspx  NHS Choices  http://www.nhs.uk/conditions/Anaesthetic-general/Pages/Definition.aspx  Last Reviewed Date  2020-04-22

## 2024-04-01 NOTE — PROGRESS NOTES
History of Present Illness: Camron Junior is here today for a post op visit.  He is one week out from a partial medial and lateral menisectomy. He is having normal post-operative soreness. Overall, he is doing well.     Physical Exam: The wounds are healing nicely.  There is no redness, drainage, or warmth.  Swelling and ecchymosis are normal for this stage of healing.  Range of motion is appropriate.  The limb is neurovascularly intact.     Radiographs: None today    Assessment: Stable right knee, status post arthroscopic partial medial and lateral menisectomy, one week out.     Plan:   Arthroscopy pictures were reviewed today with the patient.  Sutures were removed. Benzoin and Steri-Strips were applied.   He will continue icing.  He is willing to get started in a gentle therapy program and the order was given today.   He is going to follow up in 3-4 weeks to assess progress.   All questions were answered with the patient.

## 2024-04-03 ENCOUNTER — OFFICE VISIT (OUTPATIENT)
Dept: ORTHOPEDIC SURGERY | Facility: CLINIC | Age: 49
End: 2024-04-03
Payer: COMMERCIAL

## 2024-04-03 DIAGNOSIS — M23.203 OLD COMPLEX TEAR OF MEDIAL MENISCUS OF RIGHT KNEE: Primary | ICD-10-CM

## 2024-04-03 PROCEDURE — 1036F TOBACCO NON-USER: CPT | Performed by: PHYSICIAN ASSISTANT

## 2024-04-03 PROCEDURE — 99024 POSTOP FOLLOW-UP VISIT: CPT | Performed by: PHYSICIAN ASSISTANT

## 2024-04-04 ENCOUNTER — TREATMENT (OUTPATIENT)
Dept: PHYSICAL THERAPY | Facility: CLINIC | Age: 49
End: 2024-04-04
Payer: COMMERCIAL

## 2024-04-04 DIAGNOSIS — M25.561 RIGHT KNEE PAIN: ICD-10-CM

## 2024-04-04 DIAGNOSIS — M25.561 ACUTE PAIN OF RIGHT KNEE: Primary | ICD-10-CM

## 2024-04-04 PROCEDURE — 97161 PT EVAL LOW COMPLEX 20 MIN: CPT | Mod: GP

## 2024-04-04 PROCEDURE — 97110 THERAPEUTIC EXERCISES: CPT | Mod: GP

## 2024-04-04 ASSESSMENT — ENCOUNTER SYMPTOMS
LOSS OF SENSATION IN FEET: 0
DEPRESSION: 0
OCCASIONAL FEELINGS OF UNSTEADINESS: 0

## 2024-04-04 ASSESSMENT — PAIN SCALES - GENERAL: PAINLEVEL_OUTOF10: 0 - NO PAIN

## 2024-04-04 ASSESSMENT — PAIN - FUNCTIONAL ASSESSMENT: PAIN_FUNCTIONAL_ASSESSMENT: 0-10

## 2024-04-04 NOTE — PROGRESS NOTES
Physical Therapy Re-Evaluation    Patient Name: Camron Junior  MRN: [id number]  Time Calculation  Start Time: 0922  Stop Time: 0953  Time Calculation (min): 31 min  PT Evaluation Time Entry  PT Evaluation (Low) Time Entry: 10  PT Therapeutic Procedures Time Entry  Therapeutic Exercise Time Entry: 21                   Current Problem  1. Acute pain of right knee        2. Right knee pain  Follow Up In Physical Therapy    Follow Up In Physical Therapy        Insurance    Insurance:  Visit number: 2  Authorization info: no auth required  Insurance Type: Medical [employer] [place]    Subjective   General: Patient is a 47 y/o M who is here today s/p R knee medial and lateral meniscectomy on 3/28/24 with Dr. Edgar José. Patient denies any specific injury, reports worsening symptoms over time. Patient denies any locking/catching/buckling in knee. Denies any signs/symptoms of infection or DVT.     Precautions:  WBAT     Pain:  Current: 0/10  Worst: 2/10, sore    Pain Exacerbating Factors: standing, increased weightbearing, prolonged walking, squatting  Pain Relieving Factors: ice, OTC (if needed)    Reviewed medical screening form with pt and medical screening assessed      Objective   Knee Musculoskeletal Exam  Gait    Antalgic: right    Limp: right    Inspection    Right      Edema: mild        Incision: clean, dry and intact      Incisional drainage: none    Range of Motion    Right      Active extension: -5      Passive extension: 0      Active flexion: 110      Passive flexion: 115    Left      Left knee range of motion is normal.      Strength    Right      Extension: 4+/5.       Flexion: 4+/5.     Left      Extension: 5/5.       Flexion: 5/5.      Outcome Measures:  Other Measures  Lower Extremity Funtional Score (LEFS): 50     Treatment: See HEP below    EDUCATION/HEP:  Access Code: [email or web address]  URL: [email or web address]  Date: 04/04/2024  Prepared by: Maia Monroe    Exercises  -  Long Sitting Quad Set with Towel Roll Under Heel  - 1 x daily - 7 x weekly - 2 sets - 10 reps - 5 sec hold  - Supine Heel Slide with Strap  - 1 x daily - 7 x weekly - 2 sets - 10 reps - 5 sec hold  - Active Straight Leg Raise with Quad Set  - 1 x daily - 7 x weekly - 2 sets - 10 reps  - Supine Bridge with Resistance Band  - 1 x daily - 7 x weekly - 2 sets - 10 reps - 2-3 sec hold  - Clamshell with Resistance  - 1 x daily - 7 x weekly - 2 sets - 10 reps  - Squat with Resistance at Thighs  - 1 x daily - 7 x weekly - 2 sets - 10 reps  - Side Stepping with Resistance at Thighs  - 1 x daily - 7 x weekly - 2 sets - 10 reps  - Forward Monster Walks  - 1 x daily - 7 x weekly - 2 sets - 10 reps  - Standing Terminal Knee Extension with Resistance  - 1 x daily - 7 x weekly - 2 sets - 10 reps - 2-3 sec hold    Assessment:  Patient is a 49 y/o M who participated in post-op PT evaluation this date s/p R knee medial and lateral meniscectomy on 3/28/24 with Dr. Edgar José. Patient presents with R knee pain, decreased R knee ROM, decreased RLE strength, impaired gait and impaired balance. Due to these impairments, pt has increased difficulty prolonged standing and walking, bending, lifting, squatting, stairs, and performing ADLs/IADLs. Pt would benefit from PT services to decrease pain, increase ROM/tissue mobility, improve strength, gait and balance to facilitate return to prior level of activities as able.     Problem List: activity limitations, ADLs/IADLs/self care skills, decreased functional level, decreased knowledge of HEP, decreased knowledge of precautions, flexibility, pain, participation restrictions, posture, range of motion/joint mobility and strength.     Clinical Presentation: Stable     Level of Complexity: Low     Goals:  Pt will be independent with advanced HEP   Pt will increase Tay LE strength 5/5 including good eccentric quad to perform all functional mobility  Pt will demo R knee AROM 0-120 deg to perform  all functional mobility  Pt will demo R LE SLS >/=10 sec without UE assist on compliant surface for functional carryover into dynamic balance  Pt will negotiate flight of stairs mod I reciprocally without increased knee pain  Pt will ambulate community distances independent over varying surfaces/inclines without increased R knee pain                    Pt will improve LEFS score by at least 9 points (MCID) to indicate significant improvement in functional abilities.      Plan  1x/week for 3 visits     Planned interventions include: blood flow restriction, aquatic therapy, biofeedback, cryotherapy, dry needling, edema control, education/instruction, electrical stimulation, gait training, home program, hot pack, kinesiotaping, manual therapy, neuromuscular re-education, self care/home management, therapeutic activities, therapeutic exercises, ultrasound and vasopneumatic device w/ cold.

## 2024-04-08 ENCOUNTER — OFFICE VISIT (OUTPATIENT)
Dept: ORTHOPEDIC SURGERY | Facility: CLINIC | Age: 49
End: 2024-04-08
Payer: COMMERCIAL

## 2024-04-08 DIAGNOSIS — M23.203 OLD COMPLEX TEAR OF MEDIAL MENISCUS OF RIGHT KNEE: Primary | ICD-10-CM

## 2024-04-08 PROCEDURE — 99024 POSTOP FOLLOW-UP VISIT: CPT | Performed by: INTERNAL MEDICINE

## 2024-04-08 NOTE — PROGRESS NOTES
Acute Injury New Patient Visit    CC: No chief complaint on file.      HPI: Camron is a 48 y.o. male presents today for evaluation for acute right lower leg pain which started after he felt a pop two days ago. He is status post right knee arthroscopic surgery in March 28th by Dr. Edgar José. He went to the ER, duplex ultrasound was done and was negative for DVT. He is here for initial evaluation.  He denies any fevers or chills or any recent infection.        Review of Systems   GENERAL: Negative for malaise, significant weight loss, fever  MUSCULOSKELETAL: See HPI  NEURO:  Negative for numbness / tingling     Past Medical History  Past Medical History:   Diagnosis Date    Arthritis     COVID-19     VACCINATED    Meniere disease     Seasonal allergies     Snores        Medication review  Medication Documentation Review Audit       Reviewed by Melissa Brunner, MA (Medical Assistant) on 04/03/24 at 0906      Medication Order Taking? Sig Documenting Provider Last Dose Status   cetirizine (ZyrTEC) 10 mg tablet [id number] No Take 1 tablet (10 mg) by mouth once daily. Historical Provider, MD 3/27/2024 Active   meloxicam (Mobic) 15 mg tablet [id number] No Take 1 tablet (15 mg) by mouth once daily. Edgar José MD Past Week Active   multivit-min/ferrous fumarate (MULTI VITAMIN ORAL) [id number] No Take 1 tablet by mouth once daily. Historical Provider, MD 3/27/2024 Active   triamcinolone (Nasacort) 55 mcg nasal inhaler [id number] No Administer 2 sprays into each nostril once daily. Historical Provider, MD 3/27/2024 Active                    Allergies  Allergies   Allergen Reactions    Pollen Extracts Runny nose       Social History  Social History     Socioeconomic History    Marital status:      Spouse name: Not on file    Number of children: Not on file    Years of education: Not on file    Highest education level: Not on file   Occupational History    Not on file   Tobacco Use    Smoking status: Never     Smokeless tobacco: Never   Vaping Use    Vaping Use: Never used   Substance and Sexual Activity    Alcohol use: Yes     Comment: OCCASIONAL    Drug use: Never    Sexual activity: Yes     Partners: Female   Other Topics Concern    Not on file   Social History Narrative    Not on file     Social Determinants of Health     Financial Resource Strain: Not on file   Food Insecurity: Not on file   Transportation Needs: Not on file   Physical Activity: Not on file   Stress: Not on file   Social Connections: Not on file   Intimate Partner Violence: Not on file   Housing Stability: Not on file       Surgical History  Past Surgical History:   Procedure Laterality Date    WISDOM TOOTH EXTRACTION         Physical Exam:  GENERAL:  Patient is awake, alert, and oriented to person place and time.  Patient appears well nourished and well kept.  Affect Calm, Not Acutely Distressed.  HEENT:  Normocephalic, Atraumatic, EOMI  CARDIOVASCULAR:  Hemodynamically stable.  RESPIRATORY:  Normal respirations with unlabored breathing.  Extremity: Right leg shows incision is clean dry and intact.  There is no erythema or warmth.  There is no joint effusion.  There is a small palpable hematoma medial to the mid tibia, slight tender to palpate.  No erythema or warmth.  No clinical signs infection.  No calf tenderness.  No lower extremity pitting edema.  There is no pain in the lateral or medial malleolus.  No clinical findings of any compartment syndrome.  He is neurovascular intact.      Diagnostics: X-rays reviewed  ECG 12 Lead  Normal sinus rhythm  Normal ECG  No previous ECGs available  Confirmed by Floresita Bautista ([id number]) on 3/8/2024 5:29:18 PM      Procedure: None    Assessment: Right leg hematoma status post arthroscopic knee surgery    Plan: Camron presents for initial evaluation for acute right lower leg pain which started last Friday. He is status post right knee arthroscopic surgery. Reassurance was given to the patient, ultrasound was negative  for DVT and there is no clinical sign of infection.  He has a small palpable surgical hematoma we recommended warm compresses. He will follow-up with Dr José next week.     No orders of the defined types were placed in this encounter.     At the conclusion of the visit there were no further questions by the patient/family regarding their plan of care.  Patient was instructed to call or return with any issues, questions, or concerns regarding their injury and/or treatment plan described above.     04/08/24 at 8:34 AM - Omar Ronquillo MD  Scribe Attestation  By signing my name below, I, Aleksey Rodney, Scribe   attest that this documentation has been prepared under the direction and in the presence of Omar Ronquillo MD.    Office: [phone]    This note was prepared using voice recognition software.  The details of this note are correct and have been reviewed, and corrected to the best of my ability.  Some grammatical errors may persist related to the Dragon software.

## 2024-04-09 ENCOUNTER — TELEPHONE (OUTPATIENT)
Dept: ORTHOPEDIC SURGERY | Facility: CLINIC | Age: 49
End: 2024-04-09
Payer: COMMERCIAL

## 2024-04-09 NOTE — TELEPHONE ENCOUNTER
Patient called and stated his swelling in his right leg after surgery has gotten worse since he had seen Dr. Lane yesterday in the cast room, he had previously had a venous duplex of his right lower extremity that showed it was negative for DVT.  Talk to Dr. Lane which is a doctor that saw him yesterday and he said to have him come in the office and see Dr. José tomorrow.  I called the patient to let him know the recommendation, an appointment was scheduled at 815 tomorrow.  Also made patient aware if it gets worse through the night we have a doctor on-call or he can always go to the emergency department.  Patient expressed understanding

## 2024-04-10 ENCOUNTER — OFFICE VISIT (OUTPATIENT)
Dept: ORTHOPEDIC SURGERY | Facility: CLINIC | Age: 49
End: 2024-04-10
Payer: COMMERCIAL

## 2024-04-10 DIAGNOSIS — M23.203 OLD COMPLEX TEAR OF MEDIAL MENISCUS OF RIGHT KNEE: Primary | ICD-10-CM

## 2024-04-10 PROCEDURE — 99024 POSTOP FOLLOW-UP VISIT: CPT | Performed by: ORTHOPAEDIC SURGERY

## 2024-04-10 NOTE — PROGRESS NOTES
History: Camron is here for his right leg.  He is almost 2 weeks out from a right knee partial medial and lateral meniscectomy.  He had an area of swelling on the shin last Saturday and was concerned and went into the ER.  They obtained a duplex ultrasound which was negative.  He was seen in our walk-in clinic on Monday and told he had a hematoma.  He feels like the shin pain is improving but now he is having pain down into his foot that is making it difficult to ambulate.    Physical Exam: His knee wounds are healing very nicely.  He has full knee range of motion.  No tenderness to palpation medially or laterally of the joint line.  Minimal swelling to the knee.  There is a small area of swelling into the anterior shin.  There is no redness and minimal tenderness.  He he has mild swelling down into the foot.  He denies any numbness or tingling.    Radiographs: Duplex ultrasound from the ED showed no evidence of DVT.    Assessment: Stable right knee status post arthroscopic partial medial and lateral meniscectomy with lower leg swelling.    Plan: He is having normal postoperative swelling and we discussed that he may have had a little varicosity on his shin causing swelling.  I have urged him to continue to ice and elevate.  He can continue with his outpatient therapy.  We are we will see him back in 3 to 4 weeks as scheduled for follow-up.  All questions were answered the patient.

## 2024-04-15 NOTE — PROGRESS NOTES
"Physical Therapy Treatment    Patient Name: Camron Junior  MRN: [id number]  Today's Date: 4/16/2024  Time Calculation  Start Time: 0745  Stop Time: 0823  Time Calculation (min): 38 min     PT Therapeutic Procedures Time Entry  Neuromuscular Re-Education Time Entry: 8  Therapeutic Exercise Time Entry: 30                 Current Problem  1. Right knee pain  Follow Up In Physical Therapy      2. Acute pain of right knee            Insurance:  Visit number: 3  Authorization info: no auth required  Insurance Type: [facility]     Precautions   WBAT    Subjective   Subjective:   Pt reports that his knee feels fine. He doesn't have any pain in it. Pt went to ER over a week ago due to severe lower leg pain. The did a doppler, and no blood clot. Pt was told he had a hematoma. Pt then felt a lot of foot pain. He is wearing compression stockings to help with swelling. Pt just started walking and being more active again about 2 days ago. Pt now has more discomfort than pain in the foot when walking around  Pain   0/10 knee    Objective   AAROM: 117* flex  AROM: -1* ext  Treatments:  Exercises  -Bike 5'  -Supine hamstring stretch 30\"x2  - Long Sitting Quad Set 20x 5\"-   Supine Heel Slide with Strap  - 10x  - Active Straight Leg Raises 2x10  - Supine Bridge with Resistance Band  RTB 2x10  - Clamshell with Resistance RTB 20x   -s/l hip abd 2x10  -Prone quad set with hip ext 2x10  -Prone hip ext with knee bent 15x  -Seated hip Er/IR RTB 15x ea  -Standing hip abd/ext RTB B   -SLB 15\"x3  - Squat with Resistance at Thighs  - 20x--  - Side Stepping with Resistance at Thighs  RTB  2laps--  - Forward Monster Walks  -Rtb 2 laps--  - Standing Terminal Knee Extension GTB --    -OP EDUCATION:   Resume HEP, as tolerated      Assessment:   Pt tolerated bike well. Pt displayed improving knee ROM with heelslides and quad sets. Did fairly good job of maintaining hip alignment with clamshells. Tactile cues provided with s/l hip " abd to keep hip in slight extension. Introduced standing 2-way hip with good posture and did well at maintaining SLB. Normal muscle soreness and fatigue felt throughout therapy session    Plan:   Cont with R LE strengthening

## 2024-04-16 ENCOUNTER — TREATMENT (OUTPATIENT)
Dept: PHYSICAL THERAPY | Facility: CLINIC | Age: 49
End: 2024-04-16
Payer: COMMERCIAL

## 2024-04-16 DIAGNOSIS — M25.561 RIGHT KNEE PAIN: Primary | ICD-10-CM

## 2024-04-16 DIAGNOSIS — M25.561 ACUTE PAIN OF RIGHT KNEE: ICD-10-CM

## 2024-04-16 PROCEDURE — 97110 THERAPEUTIC EXERCISES: CPT | Mod: GP,CQ

## 2024-04-16 PROCEDURE — 97112 NEUROMUSCULAR REEDUCATION: CPT | Mod: GP,CQ

## 2024-04-23 NOTE — PROGRESS NOTES
"Physical Therapy Treatment    Patient Name: Camron Junior  MRN: [id number]  Today's Date: 4/24/2024  Time Calculation  Start Time: 0752  Stop Time: 0832  Time Calculation (min): 40 min     PT Therapeutic Procedures Time Entry  Neuromuscular Re-Education Time Entry: 10  Therapeutic Exercise Time Entry: 28                 Current Problem  1. Acute pain of right knee        2. Right knee pain  Follow Up In Physical Therapy          Insurance:  Visit number: 4  Authorization info: no auth required  Insurance Type: [facility]  Precautions    WBAT     Subjective   Subjective:   Pt reports that his knee is doing pretty good. He currently doesn't have any pain. His biggest complaint is that he still has swelling in his foot. He went back to work this week and it is going fine  Pain   0/10    Objective   AAROM: 125* flex  AROM: -1* ext  Treatments:  Exercises  -Bike 6'  -Supine hamstring stretch 30\"x2  - Long Sitting Quad Set 20x 5\"-   Supine Heel Slide with Strap  - 10x  - Active Straight Leg Raises 2x10\"  -supine VMO SLR 2x10  - Supine Bridge with Resistance Band  GTB 2x10  - Clamshell with Resistance GTB 20x   -Standing hip abd/ext GTB B 2x10  -SLB airex 15\"x3  -wall sits 15\"x3  -step ups Fwd/lat 8 in 20x  - Squat with Resistance at Thighs  - 20x--  - W Side Stepping with Resistance at Thighs  GTB  2laps  -Leg Press # 20x  -Leg Press # 20x  Seated HSC DL 65# 20x  Seated HSC SL     - Forward Monster Walks  --s/l hip abd 2x10---  -Prone quad set with hip ext 2x10---  -Prone hip ext with knee bent 15x----  -Seated hip Er/IR RTB 15x ea---Rtb 2 laps--  - Standing Terminal Knee Extension GTB --  OP EDUCATION:   Hip/leg alignment w/ ex      Assessment:   Pt displays good knee ROM, as seen with heel slides and SLRs. Able to increase resistance for many of the exercises. Still challenged with not allowing the pelvis to rotate. Introduced step ups for more quad activation. Pt did well with these. " "Added airex to SLB, with just able to achieve for about 12\"Pt able to maintain control with tap downs. Pt did well with leg press and HSC. Normal muscle soreness and fatigue following therapy session.    Plan:   Cont with quad and hip strengthening              "

## 2024-04-24 ENCOUNTER — OFFICE VISIT (OUTPATIENT)
Dept: ORTHOPEDIC SURGERY | Facility: CLINIC | Age: 49
End: 2024-04-24
Payer: COMMERCIAL

## 2024-04-24 ENCOUNTER — TREATMENT (OUTPATIENT)
Dept: PHYSICAL THERAPY | Facility: CLINIC | Age: 49
End: 2024-04-24
Payer: COMMERCIAL

## 2024-04-24 DIAGNOSIS — M25.561 ACUTE PAIN OF RIGHT KNEE: Primary | ICD-10-CM

## 2024-04-24 DIAGNOSIS — M23.203 OLD COMPLEX TEAR OF MEDIAL MENISCUS OF RIGHT KNEE: Primary | ICD-10-CM

## 2024-04-24 DIAGNOSIS — M25.561 RIGHT KNEE PAIN: ICD-10-CM

## 2024-04-24 PROCEDURE — 97110 THERAPEUTIC EXERCISES: CPT | Mod: GP,CQ

## 2024-04-24 PROCEDURE — 97112 NEUROMUSCULAR REEDUCATION: CPT | Mod: GP,CQ

## 2024-04-24 PROCEDURE — 99024 POSTOP FOLLOW-UP VISIT: CPT | Performed by: ORTHOPAEDIC SURGERY

## 2024-04-24 NOTE — PROGRESS NOTES
History: Camron is here for his right knee.  He is 1 month out from a right knee partial medial and lateral meniscectomy.  His knee is doing very well.  His biggest issue send surgery has been swelling down into the foot.  He had a negative duplex ultrasound and started wearing a compression sock.  He does feel like it is improving.    Physical Exam: The wounds are healing nicely.  He has full range of motion to the knee.  Minimal tenderness to palpation medially or laterally.  No significant swelling to the knee.  Mild swelling down around the ankle which is improved from previous exam.  He denies any numbness or tingling.    Radiographs: None today    Assessment: Stable right knee status post arthroscopic partial medial and lateral meniscectomy, 1 month out    Plan: His knee is doing very well and he is going to continue to finish out his therapy program.  He is already back to work.  He is going to drop off [place] paperwork for us to fill out for his days missed.  He does notice an improvement in the swelling since using the compression sock.  He understands it would likely be a few more weeks for the swelling to dissipate.  We would be happy to see him back if he should have any issues but if continuing to do well can follow-up as needed.  All questions were answered the patient.

## 2024-04-30 ENCOUNTER — TREATMENT (OUTPATIENT)
Dept: PHYSICAL THERAPY | Facility: CLINIC | Age: 49
End: 2024-04-30
Payer: COMMERCIAL

## 2024-04-30 DIAGNOSIS — M25.561 ACUTE PAIN OF RIGHT KNEE: Primary | ICD-10-CM

## 2024-04-30 DIAGNOSIS — M25.561 RIGHT KNEE PAIN: ICD-10-CM

## 2024-04-30 PROCEDURE — 97530 THERAPEUTIC ACTIVITIES: CPT | Mod: GP

## 2024-04-30 NOTE — PROGRESS NOTES
Physical Therapy Discharge Summary    Patient Name: Camron Junior  MRN: [id number]  Time Calculation  Start Time: 0745  Stop Time: 0802  Time Calculation (min): 17 min     PT Therapeutic Procedures Time Entry  Therapeutic Activity Time Entry: 17                   Current Problem  1. Acute pain of right knee        2. Right knee pain  Follow Up In Physical Therapy      Insurance:  Visit number: 5  Authorization info: no auth required  Insurance Type: [facility]    Subjective   General  Pt reports feeling pretty good, still with some swelling but reports it is improving. No c/o pain coming in and feels he is ready to be discharged from PT.  Precautions  WBAT  Pain  0/10    Objective   Knee Musculoskeletal Exam  Gait    Gait is normal.    Range of Motion    Right      Active extension: 0      Active flexion: 120    Left      Left knee range of motion is normal.      Strength    Right      Extension: 5/5.       Flexion: 5/5.     Left      Extension: 5/5.       Flexion: 5/5.        Treatments:  Includes measurements for discharge  Flight of stairs  MARCK: L5 6'    Outcome Measures:  Other Measures  Lower Extremity Funtional Score (LEFS): 77    OP EDUCATION/HEP:      Assessment   Patient is a 47 y/o M who has completed 5 PT visits s/p R knee medial and lateral menisectomy on 3/28/24 with Dr. Edgar José. Pt has made significant progress towards all functional goals since starting PT. Pt feels he is at least 85-90% improved since surgery and is back to participating in all of his regular activities without any difficulty. Pt has no questions or concerns about discharge from PT services at this time, w/ recommendation to continue with his exercises on his own at home. Will follow-up as needed.    Goals:  Pt will be independent with advanced HEP GOAL MET  Pt will increase Tay LE strength 5/5 including good eccentric quad to perform all functional mobility GOAL MET  Pt will demo R knee AROM 0-120 deg to  perform all functional mobility GOAL MET  Pt will demo R LE SLS >/=10 sec without UE assist on compliant surface for functional carryover into dynamic balance GOAL MET  Pt will negotiate flight of stairs mod I reciprocally without increased knee pain GOAL MET  Pt will ambulate community distances independent over varying surfaces/inclines without increased R knee pain GOAL MET  Pt will improve LEFS score by at least 9 points (MCID) to indicate significant improvement in functional abilities. GOAL MET    Plan   Discharge patient d/t all and/or most significant goals met.

## 2024-05-06 ENCOUNTER — APPOINTMENT (OUTPATIENT)
Dept: ORTHOPEDIC SURGERY | Facility: CLINIC | Age: 49
End: 2024-05-06
Payer: COMMERCIAL

## 2024-12-16 ENCOUNTER — CLINICAL SUPPORT (OUTPATIENT)
Dept: AUDIOLOGY | Facility: CLINIC | Age: 49
End: 2024-12-16
Payer: COMMERCIAL

## 2024-12-16 ENCOUNTER — APPOINTMENT (OUTPATIENT)
Dept: OTOLARYNGOLOGY | Facility: CLINIC | Age: 49
End: 2024-12-16
Payer: COMMERCIAL

## 2024-12-16 DIAGNOSIS — H90.41 SENSORINEURAL HEARING LOSS (SNHL) OF RIGHT EAR WITH UNRESTRICTED HEARING OF LEFT EAR: ICD-10-CM

## 2024-12-16 DIAGNOSIS — H93.11 TINNITUS OF RIGHT EAR: ICD-10-CM

## 2024-12-16 DIAGNOSIS — H81.01 MENIERE'S DISEASE OF RIGHT EAR: Primary | ICD-10-CM

## 2024-12-16 PROCEDURE — 92557 COMPREHENSIVE HEARING TEST: CPT | Performed by: AUDIOLOGIST

## 2024-12-16 PROCEDURE — 92567 TYMPANOMETRY: CPT | Performed by: AUDIOLOGIST

## 2024-12-16 PROCEDURE — 99213 OFFICE O/P EST LOW 20 MIN: CPT | Performed by: OTOLARYNGOLOGY

## 2024-12-16 NOTE — PROGRESS NOTES
Patient returns.  Seeing him back today surveillance recheck history of Ménière's disease.  He is doing very well.  Has not had any significant issues with vertigo.  Occasionally notes some increased tinnitus.  Some slight fluctuation in hearing but nothing extreme.  All remaining head neck inquiry is good.      There have been no significant changes in past medical or past surgical histories except as mentioned.      Exam:  No acute distress.  The external ear structures appear normal. The ear canals patent and the tympanic membranes are intact without evidence of air-fluid levels, retraction, or congenital defects.  Anterior rhinoscopy notes essentially a midline nasal septum. Examination is noted for normal healthy mucosal membranes without any evidence of lesions, polyps, or exudate. The tongue is normally mobile. There are no lesions on the gingiva, buccal, or oral mucosa. There are no oral cavity masses.  The neck is negative for mass lymphadenopathy. The trachea and parotid are clear. The thyroid bed is grossly unremarkable. The salivary gland structures are grossly unremarkable.    Audiogram: Compared to previous in April 2023 actually shows improvement in right ear pure-tone's.    Assessment and plan:  History of Ménière's disease overall functioning fairly well.  Continue with observation.  Recheck with me 2 years, sooner with issue.  All questions were answered in this regard accordingly.

## 2024-12-16 NOTE — PROGRESS NOTES
Mr. Junior, age 49, was seen today for a hearing evaluation during his ENT visit with Dr. Dwyer.  He has a known history of Meniere's disease with associated hearing loss and tinnitus in his right ear.  He denies change or new concern at today's visit.    Results:  Otoscopy revealed clear ear canals and tympanic membranes were visualized bilaterally.  Tympanometry revealed normal, Type A tympanograms, indicating normal ear canal volume, peak pressure and compliance bilaterally.   Audiometric thresholds revealed normal hearing sensitivity in is left ear and a mild hearing loss  affecting 250 Hz as well as 1494-5456 Hz in his right ear.  Word recognition scores were excellent bilaterally.  Hearing levels have remained stable in his left ear and have significantly improved in his right ear as compared to his last evaluation April 2023.    Recommendations:  Follow-up with PCP, Cathy King CNP, as medically directed.  Follow-up with ENT, Dr. Dwyer, as medically directed.  Retest hearing as directed in conjunction with otologic care or annually to monitor.

## 2025-05-06 NOTE — PROGRESS NOTES
"    History: Camron \"Jacoby\" is here for his right knee. He is over 1 year out from a right knee partial medial and lateral meniscectomy. He has some discomfort in the back of the knee. He does stand frequently in a locked knee position which exacerbates the pain and swelling.  He also notes some increased discomfort with prolonged activity such as yard work.    Past medical history: Multiple  Medications: Multiple  Allergies: No known drug allergies    Please refer to the intake H&P regarding the patient's review of systems, family history and social history as was done today    HEENT: Normal  Lungs: Clear to auscultation  Heart: RRR  Abdomen: Soft, nontender  Skin: clear  Extremity: He has fairly mild swelling posteriorly today.  Very slight pain over the medial joint line.  No pain laterally.  He has full range of motion.  Negative Lachman and posterior drawer.  Negative Keena's maneuver.  No numbness or tingling.  Contralateral exam is normal for strength, motion, stability and neurovascular assessment.    Radiographs: None today.  Previous x-rays showed mild DJD and arthropathy pictures did show some grade 2 change throughout.    Assessment: Stable right knee status post arthroscopic partial medial and lateral meniscectomy, 1 year out with some mild to moderate DJD    Plan: He returns today for recurrent posterior knee pain. We discussed that this is potentially from the arthritis in the knee. He understands this. We gave him a prescription trial of Mobic today to use on an as-needed basis. He can use ice and his knee brace as needed. We will see him back if he has recurrent issues or discomfort.    All questions were answered today with the patient.    Scribe Attestation  By signing my name below, Kasandra LOPEZ Scribe   attest that this documentation has been prepared under the direction and in the presence of Edgar José MD.    "

## 2025-05-07 ENCOUNTER — APPOINTMENT (OUTPATIENT)
Dept: ORTHOPEDIC SURGERY | Facility: CLINIC | Age: 50
End: 2025-05-07
Payer: COMMERCIAL

## 2025-05-07 DIAGNOSIS — M17.11 PRIMARY OSTEOARTHRITIS OF RIGHT KNEE: ICD-10-CM

## 2025-05-07 PROCEDURE — 99212 OFFICE O/P EST SF 10 MIN: CPT | Performed by: ORTHOPAEDIC SURGERY

## 2025-05-07 PROCEDURE — 1036F TOBACCO NON-USER: CPT | Performed by: ORTHOPAEDIC SURGERY

## 2025-05-07 PROCEDURE — 99214 OFFICE O/P EST MOD 30 MIN: CPT | Performed by: ORTHOPAEDIC SURGERY

## 2025-05-07 RX ORDER — MELOXICAM 15 MG/1
15 TABLET ORAL DAILY PRN
Qty: 30 TABLET | Refills: 2 | Status: SHIPPED | OUTPATIENT
Start: 2025-05-07 | End: 2025-08-05

## (undated) DEVICE — GLOVE, SURGICAL, PROTEXIS PI , 7.0, PF, LF

## (undated) DEVICE — PADDING, CAST, SPECIALIST, 6 IN X 4 YD, STERILE

## (undated) DEVICE — DRESSING, GAUZE, PETROLATUM, STRIP, XEROFORM, 1 X 8 IN, STERILE

## (undated) DEVICE — COVER, MAYO STAND, W/PAD, 23 IN, DISPOSABLE, PLASTIC, LF, STERILE

## (undated) DEVICE — Device

## (undated) DEVICE — GLOVE, SURGICAL, PROTEXIS PI , 7.5, PF, LF

## (undated) DEVICE — BANDAGE, ELASTIC, MATRIX, SELF-CLOSURE, 6 IN X 5 YD, LF

## (undated) DEVICE — PREP, IODOPHOR, W/ALCOHOL, DURAPREP, W/APPLICATOR, 26 CC

## (undated) DEVICE — MAT, FLOOR, STANDARD FLUID BARRIER, 32X44, GREEN

## (undated) DEVICE — SUTURE, ETHILON, 3-0, 18 IN, PS1, BLACK

## (undated) DEVICE — STRAP, VELCRO, BODY, 4 X 60IN, NS

## (undated) DEVICE — STRAP, ARM BOARD, 32 X 1.5

## (undated) DEVICE — TUBING, PUMP REDEUCE 8FT STERILE

## (undated) DEVICE — SOLUTION, IRRIGATION, USP, SODIUM CHLORIDE 0.9%, 3000 ML

## (undated) DEVICE — HOLSTER, ELECTROSURGERY ACCESSORY, STERILE

## (undated) DEVICE — BOWL, BASIN, 32 OZ, STERILE

## (undated) DEVICE — DRESSING, ABDOMINAL PAD, CURITY, 7.5 X 8 IN

## (undated) DEVICE — TUBING, SUCTION, 6MM X 10, CLEAN N-COND

## (undated) DEVICE — TUBING, PATIENT 8FT STERILE

## (undated) DEVICE — GLOVE, SURGICAL, PROTEXIS PI BLUE W/NEUTHERA, 7.0, PF, LF